# Patient Record
(demographics unavailable — no encounter records)

---

## 2025-01-03 NOTE — HISTORY OF PRESENT ILLNESS
[FreeTextEntry1] : Annual and establish care [de-identified] : 33-year-old female with no significant mass past medical history presenting to establish care and for an annual physical exam. Patient recently moved here from [place].  Ob history: 2022 NVD baby girl. miscarriage at 6 weeks in June 2024. Has been trying to get pregnant since then. Doesn't have regular cycle. Had hormone w/u that were negative. has a family history of infertility.

## 2025-01-03 NOTE — HEALTH RISK ASSESSMENT
[Very Good] : ~his/her~ current health as very good [Good] : ~his/her~  mood as  good [No falls in past year] : Patient reported no falls in the past year [0] : 2) Feeling down, depressed, or hopeless: Not at all (0) [PHQ-2 Negative - No further assessment needed] : PHQ-2 Negative - No further assessment needed [With Family] : lives with family [# of Members in Household ___] :  household currently consist of [unfilled] member(s) [Employed] : employed [Graduate School] : graduate school [] :  [# Of Children ___] : has [unfilled] children [Never] : Never [No] : In the past 12 months have you used drugs other than those required for medical reasons? No [Patient reported PAP Smear was normal] : Patient reported PAP Smear was normal [HIV test declined] : HIV test declined [Hepatitis C test declined] : Hepatitis C test declined [None] : None [Fully functional (bathing, dressing, toileting, transferring, walking, feeding)] : Fully functional (bathing, dressing, toileting, transferring, walking, feeding) [Fully functional (using the telephone, shopping, preparing meals, housekeeping, doing laundry, using] : Fully functional and needs no help or supervision to perform IADLs (using the telephone, shopping, preparing meals, housekeeping, doing laundry, using transportation, managing medications and managing finances) [Reports normal functional visual acuity (ie: able to read med bottle)] : Reports normal functional visual acuity [Audit-CScore] : 0 [de-identified] : Walker  [de-identified] : Good  [[id number]] : 0 [Reports changes in hearing] : Reports no changes in hearing [Reports changes in vision] : Reports no changes in vision [Reports changes in dental health] : Reports no changes in dental health [PapSmearDate] : 01/23 [FreeTextEntry2] : [employer]

## 2025-01-03 NOTE — ASSESSMENT
[FreeTextEntry1] : 33-year-old female presented to establish care and for an annual physical exam.  routine blood work today, blood collected in the office. Referred to reproductive endo for infertility w/u. up to date with screening. will call back with results.

## 2025-05-16 NOTE — HEALTH RISK ASSESSMENT
[No] : No [No falls in past year] : Patient reported no falls in the past year [0] : 2) Feeling down, depressed, or hopeless: Not at all (0) [Never] : Never [Audit-CScore] : 0

## 2025-05-16 NOTE — HISTORY OF PRESENT ILLNESS
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  Patient has an appointment with OB/GYN on 5/30/2025.  Patient is requesting to have progesterone checked.  She is on prenatal and has no other concerns.